# Patient Record
Sex: FEMALE | Race: NATIVE HAWAIIAN OR OTHER PACIFIC ISLANDER
[De-identification: names, ages, dates, MRNs, and addresses within clinical notes are randomized per-mention and may not be internally consistent; named-entity substitution may affect disease eponyms.]

---

## 2021-04-12 ENCOUNTER — HOSPITAL ENCOUNTER (EMERGENCY)
Dept: HOSPITAL 35 - ER | Age: 46
Discharge: HOME | End: 2021-04-12
Payer: COMMERCIAL

## 2021-04-12 VITALS — SYSTOLIC BLOOD PRESSURE: 124 MMHG | DIASTOLIC BLOOD PRESSURE: 64 MMHG

## 2021-04-12 DIAGNOSIS — Z91.041: ICD-10-CM

## 2021-04-12 DIAGNOSIS — Z79.899: ICD-10-CM

## 2021-04-12 DIAGNOSIS — Z88.8: ICD-10-CM

## 2021-04-12 DIAGNOSIS — Z88.0: ICD-10-CM

## 2021-04-12 DIAGNOSIS — M54.5: ICD-10-CM

## 2021-04-12 DIAGNOSIS — I10: ICD-10-CM

## 2021-04-12 DIAGNOSIS — Z90.49: ICD-10-CM

## 2021-04-12 DIAGNOSIS — M25.561: Primary | ICD-10-CM

## 2021-05-11 ENCOUNTER — HOSPITAL ENCOUNTER (EMERGENCY)
Dept: HOSPITAL 35 - ER | Age: 46
LOS: 1 days | Discharge: STILL ADMITTED OR ADMINISTRATIVE | End: 2021-05-12
Payer: COMMERCIAL

## 2021-05-11 VITALS — SYSTOLIC BLOOD PRESSURE: 146 MMHG | DIASTOLIC BLOOD PRESSURE: 95 MMHG

## 2021-05-11 VITALS — BODY MASS INDEX: 43.8 KG/M2 | WEIGHT: 232.01 LBS | HEIGHT: 61 IN

## 2021-05-11 DIAGNOSIS — Z91.041: ICD-10-CM

## 2021-05-11 DIAGNOSIS — Z88.5: ICD-10-CM

## 2021-05-11 DIAGNOSIS — E11.9: ICD-10-CM

## 2021-05-11 DIAGNOSIS — Z79.4: ICD-10-CM

## 2021-05-11 DIAGNOSIS — Z90.49: ICD-10-CM

## 2021-05-11 DIAGNOSIS — R10.13: ICD-10-CM

## 2021-05-11 DIAGNOSIS — Z88.8: ICD-10-CM

## 2021-05-11 DIAGNOSIS — Z79.899: ICD-10-CM

## 2021-05-11 DIAGNOSIS — F10.920: Primary | ICD-10-CM

## 2021-05-11 DIAGNOSIS — Z88.0: ICD-10-CM

## 2021-05-11 DIAGNOSIS — R11.2: ICD-10-CM

## 2021-05-11 DIAGNOSIS — F41.9: ICD-10-CM

## 2021-05-11 DIAGNOSIS — I10: ICD-10-CM

## 2021-05-11 DIAGNOSIS — N39.0: ICD-10-CM

## 2021-05-11 DIAGNOSIS — K59.00: ICD-10-CM
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
## 2021-05-11 LAB
ALBUMIN SERPL-MCNC: 3.6 G/DL (ref 3.4–5)
ALT SERPL-CCNC: 286 U/L (ref 30–65)
AMYLASE SERPL-CCNC: 107 U/L (ref 25–115)
ANION GAP SERPL CALC-SCNC: 17 MMOL/L (ref 7–16)
AST SERPL-CCNC: 187 U/L (ref 15–37)
BACTERIA-REFLEX: (no result) /HPF
BASOPHILS NFR BLD AUTO: 1.5 % (ref 0–2)
BILIRUB DIRECT SERPL-MCNC: < 0.1 MG/DL
BILIRUB SERPL-MCNC: 0.2 MG/DL (ref 0.2–1)
BILIRUB UR-MCNC: NEGATIVE MG/DL
BUN SERPL-MCNC: 10 MG/DL (ref 7–18)
CALCIUM SERPL-MCNC: 9 MG/DL (ref 8.5–10.1)
CHLORIDE SERPL-SCNC: 102 MMOL/L (ref 98–107)
CO2 SERPL-SCNC: 20 MMOL/L (ref 21–32)
COLOR UR: YELLOW
CREAT SERPL-MCNC: 0.8 MG/DL (ref 0.6–1)
EOSINOPHIL NFR BLD: 3.5 % (ref 0–3)
ERYTHROCYTE [DISTWIDTH] IN BLOOD BY AUTOMATED COUNT: 14.3 % (ref 10.5–14.5)
FINE GRAN CASTS #/AREA URNS LPF: (no result) /LPF
GLUCOSE SERPL-MCNC: 170 MG/DL (ref 74–106)
GRANULOCYTES NFR BLD MANUAL: 51.4 % (ref 36–66)
HCT VFR BLD CALC: 40.7 % (ref 37–47)
HGB BLD-MCNC: 13.7 GM/DL (ref 12–15)
KETONES UR STRIP-MCNC: NEGATIVE MG/DL
LIPASE: 168 U/L (ref 73–393)
LYMPHOCYTES NFR BLD AUTO: 33.5 % (ref 24–44)
MAGNESIUM SERPL-MCNC: 1.7 MG/DL (ref 1.8–2.4)
MCH RBC QN AUTO: 32.1 PG (ref 26–34)
MCHC RBC AUTO-ENTMCNC: 33.6 G/DL (ref 28–37)
MCV RBC: 95.6 FL (ref 80–100)
MONOCYTES NFR BLD: 10.1 % (ref 1–8)
NEUTROPHILS # BLD: 2.6 THOU/UL (ref 1.4–8.2)
PHOSPHATE SERPL-MCNC: 4 MG/DL (ref 2.5–4.9)
PLATELET # BLD: 217 THOU/UL (ref 150–400)
POTASSIUM SERPL-SCNC: 3.3 MMOL/L (ref 3.5–5.1)
PROT SERPL-MCNC: 8.2 G/DL (ref 6.4–8.2)
RBC # BLD AUTO: 4.25 MIL/UL (ref 4.2–5)
RBC # UR STRIP: (no result) /UL
RBC #/AREA URNS HPF: (no result) /HPF
SODIUM SERPL-SCNC: 139 MMOL/L (ref 136–145)
SP GR UR STRIP: <= 1.005 (ref 1–1.03)
SQUAMOUS: (no result) /LPF (ref 0–3)
TRANSITIONAL EPITHEL CELL: (no result) /LPF
TROPONIN I SERPL-MCNC: <0.06 NG/ML (ref ?–0.06)
URINE CLARITY: CLEAR
URINE GLUCOSE-RANDOM*: NEGATIVE
URINE LEUKOCYTES-REFLEX: NEGATIVE
URINE NITRITE-REFLEX: NEGATIVE
URINE PROTEIN (DIPSTICK): NEGATIVE
URINE WBC-REFLEX: (no result) /HPF (ref 0–5)
UROBILINOGEN UR STRIP-ACNC: 0.2 E.U./DL (ref 0.2–1)
WBC # BLD AUTO: 5.1 THOU/UL (ref 4–11)

## 2021-05-12 NOTE — EKG
[place] [address] [facility]
[place]  [address]
Phone:  [phone]                    ELECTROCARDIOGRAM REPORT      
_______________________________________________________________________________
 
Name:       DES LEE      Room #:                     DEP [facility]#:      [id number]     Account #:      [id number]  
Admission:  21    Attend Phys:                          
Discharge:  21    Date of Birth:  [birth year]  
                                                          Report #: [id number]
   [id number]
_______________________________________________________________________________
                         [facility] ED
                                       
Test Date:    2021               Test Time:    20:57:15
Pat Name:     DES LEE         Department:   
Patient ID:   [id number]            Room:          
Gender:       F                        Technician:   unknown
:          1975               Requested By: Marshal Cunningham
Order Number: [id number] MD:   Frankie Choudhury
                                 Measurements
Intervals                              Axis          
Rate:         124                      P:            17
FL:           155                      QRS:          -9
QRSD:         83                       T:            13
QT:           313                                    
QTc:          450                                    
                           Interpretive Statements
Sinus tachycardia
Inferior infarct, old
Poor R wave progression
No previous ECG available for comparison
Electronically Signed On 2021 13:47:56 CDT by Frankie Choudhuyr
[email or web address]
 
 
 
 
 
 
 
 
 
 
 
 
 
 
 
 
 
 
 
 
 
  <ELECTRONICALLY SIGNED>
   By: Frankie Choudhury MD, [facility]   
  21     1347
D: 21                           _____________________________________
T: 21                           Frankie Choudhury MD, FACC     /EPI

## 2021-06-24 ENCOUNTER — HOSPITAL ENCOUNTER (INPATIENT)
Dept: HOSPITAL 35 - ER | Age: 46
LOS: 5 days | Discharge: HOME | DRG: 392 | End: 2021-06-29
Attending: HOSPITALIST | Admitting: HOSPITALIST
Payer: COMMERCIAL

## 2021-06-24 VITALS — SYSTOLIC BLOOD PRESSURE: 170 MMHG | DIASTOLIC BLOOD PRESSURE: 89 MMHG

## 2021-06-24 VITALS — HEIGHT: 60.98 IN | BODY MASS INDEX: 44.75 KG/M2 | WEIGHT: 237 LBS

## 2021-06-24 DIAGNOSIS — Z90.49: ICD-10-CM

## 2021-06-24 DIAGNOSIS — K52.9: Primary | ICD-10-CM

## 2021-06-24 DIAGNOSIS — E11.9: ICD-10-CM

## 2021-06-24 DIAGNOSIS — G89.29: ICD-10-CM

## 2021-06-24 DIAGNOSIS — Z91.041: ICD-10-CM

## 2021-06-24 DIAGNOSIS — F41.9: ICD-10-CM

## 2021-06-24 DIAGNOSIS — C95.91: ICD-10-CM

## 2021-06-24 DIAGNOSIS — E78.5: ICD-10-CM

## 2021-06-24 DIAGNOSIS — N83.209: ICD-10-CM

## 2021-06-24 DIAGNOSIS — Z88.0: ICD-10-CM

## 2021-06-24 DIAGNOSIS — Z79.899: ICD-10-CM

## 2021-06-24 DIAGNOSIS — E66.9: ICD-10-CM

## 2021-06-24 DIAGNOSIS — K62.89: ICD-10-CM

## 2021-06-24 DIAGNOSIS — I10: ICD-10-CM

## 2021-06-24 DIAGNOSIS — Z88.5: ICD-10-CM

## 2021-06-24 DIAGNOSIS — Z88.8: ICD-10-CM

## 2021-06-24 DIAGNOSIS — K64.4: ICD-10-CM

## 2021-06-24 DIAGNOSIS — K92.1: ICD-10-CM

## 2021-06-24 DIAGNOSIS — J45.909: ICD-10-CM

## 2021-06-24 PROCEDURE — 62900: CPT

## 2021-06-24 PROCEDURE — 70005: CPT

## 2021-06-24 PROCEDURE — 10195: CPT

## 2021-06-24 PROCEDURE — 62110: CPT

## 2021-06-25 VITALS — DIASTOLIC BLOOD PRESSURE: 68 MMHG | SYSTOLIC BLOOD PRESSURE: 120 MMHG

## 2021-06-25 VITALS — DIASTOLIC BLOOD PRESSURE: 114 MMHG | SYSTOLIC BLOOD PRESSURE: 173 MMHG

## 2021-06-25 VITALS — SYSTOLIC BLOOD PRESSURE: 120 MMHG | DIASTOLIC BLOOD PRESSURE: 68 MMHG

## 2021-06-25 VITALS — SYSTOLIC BLOOD PRESSURE: 150 MMHG | DIASTOLIC BLOOD PRESSURE: 96 MMHG

## 2021-06-25 LAB
ALBUMIN SERPL-MCNC: 3.4 G/DL (ref 3.4–5)
ALT SERPL-CCNC: 167 U/L (ref 30–65)
ANION GAP SERPL CALC-SCNC: 15 MMOL/L (ref 7–16)
AST SERPL-CCNC: 101 U/L (ref 15–37)
BASOPHILS NFR BLD AUTO: 1.1 % (ref 0–2)
BILIRUB SERPL-MCNC: 0.3 MG/DL (ref 0.2–1)
BILIRUB UR-MCNC: NEGATIVE MG/DL
BUN SERPL-MCNC: 13 MG/DL (ref 7–18)
CALCIUM SERPL-MCNC: 8.5 MG/DL (ref 8.5–10.1)
CHLORIDE SERPL-SCNC: 99 MMOL/L (ref 98–107)
CO2 SERPL-SCNC: 23 MMOL/L (ref 21–32)
COLOR UR: YELLOW
CREAT SERPL-MCNC: 0.9 MG/DL (ref 0.6–1)
EOSINOPHIL NFR BLD: 2.9 % (ref 0–3)
ERYTHROCYTE [DISTWIDTH] IN BLOOD BY AUTOMATED COUNT: 13.7 % (ref 10.5–14.5)
EST. AVERAGE GLUCOSE BLD GHB EST-MCNC: 217 MG/DL
GLUCOSE SERPL-MCNC: 255 MG/DL (ref 74–106)
GLYCOHEMOGLOBIN (HGB A1C): 9.2 % (ref 4.8–5.6)
GRANULOCYTES NFR BLD MANUAL: 61.3 % (ref 36–66)
HCT VFR BLD CALC: 39.9 % (ref 37–47)
HGB BLD-MCNC: 13.1 GM/DL (ref 12–15)
KETONES UR STRIP-MCNC: (no result) MG/DL
LIPASE: 153 U/L (ref 73–393)
LYMPHOCYTES NFR BLD AUTO: 26.2 % (ref 24–44)
MCH RBC QN AUTO: 31.8 PG (ref 26–34)
MCHC RBC AUTO-ENTMCNC: 32.9 G/DL (ref 28–37)
MCV RBC: 96.9 FL (ref 80–100)
MONOCYTES NFR BLD: 8.5 % (ref 1–8)
NEUTROPHILS # BLD: 4.8 THOU/UL (ref 1.4–8.2)
PLATELET # BLD: 279 THOU/UL (ref 150–400)
POTASSIUM SERPL-SCNC: 4.7 MMOL/L (ref 3.5–5.1)
PROT SERPL-MCNC: 8.1 G/DL (ref 6.4–8.2)
RBC # BLD AUTO: 4.11 MIL/UL (ref 4.2–5)
RBC # UR STRIP: NEGATIVE /UL
SODIUM SERPL-SCNC: 137 MMOL/L (ref 136–145)
SP GR UR STRIP: 1.02 (ref 1–1.03)
URINE CLARITY: CLEAR
URINE GLUCOSE-RANDOM*: (no result)
URINE LEUKOCYTES-REFLEX: NEGATIVE
URINE NITRITE-REFLEX: NEGATIVE
URINE PROTEIN (DIPSTICK): NEGATIVE
UROBILINOGEN UR STRIP-ACNC: 0.2 E.U./DL (ref 0.2–1)
VIT B12 SERPL-MCNC: 568 PG/ML (ref 193–986)
WBC # BLD AUTO: 7.8 THOU/UL (ref 4–11)

## 2021-06-25 NOTE — EKG
[address]  [address]
Phone:  [phone]                    ELECTROCARDIOGRAM REPORT      
_______________________________________________________________________________
 
Name:       ROSADES KARLIE      Room #:         447-P       ADM IN  
M.R.#:      [id number]     Account #:      [id number]  
Admission:  21    Attend Phys:    Moreno Vuong MD 
Discharge:              Date of Birth:  [birth year]  
                                                          Report #: [id number]
   [id number]
_______________________________________________________________________________
                          [facility]
                                       
Test Date:    2021               Test Time:    09:22:30
Pat Name:     DES LEE         Department:   
Patient ID:   [id number]            Room:         [facility]
Gender:       F                        Technician:   FSCHWALANASTASIA
:          1975               Requested By: Fei Jiménez
Order Number: [id number] MD:   Domingo Julio
                                 Measurements
Intervals                              Axis          
Rate:         92                       P:            40
PA:           167                      QRS:          17
QRSD:         88                       T:            31
QT:           355                                    
QTc:          440                                    
                           Interpretive Statements
Sinus rhythm
Compared to ECG 2021 20:57:15
Sinus tachycardia no longer present
Myocardial infarct finding no longer present
Poor R-wave progression no longer present
Electronically Signed On 2021 11:36:07 CDT by Domingo Julio
[email or web address]
 
 
 
 
 
 
 
 
 
 
 
 
 
 
 
 
 
 
 
 
  <ELECTRONICALLY SIGNED>
   By: Domingo Julio MD, [facility]    
  21     1136
D: 21                           _____________________________________
T: 21                           Domingo Julio MD, [facility]      /EPI

## 2021-06-25 NOTE — NUR
ASSUMED PT CARE FROM ED AT 1030. PT IS ALERT & ORIENTED X4. PT HAS IV SITE ON
ROSEMARIE 22 GAUGE RUNNING NS @ 100ML/HR. PT C/O OF PAIN ON R ABDOMEN. NO TEETH
NOTED. INFORMED DR ABOUT PAIN, NAUSEA, FEVER AND DIET ORDERED. AWAITING FOR
ORDERS. FINISHED ADMISSION. PT IS UP AD RISHI. PT IS ON ROOM AIR. GIVEN CHICKEN
BROTH AS PER PT REQUEST THIS AM. PT ON THE BED WATCHING TV, BED ON THE LOWEST
POSITION, SIDE RAILS UP, CALL LIGHT WITHIN REACH. WILL CONTINUE TO MONITOR PT.
FOLLOW POC.

## 2021-06-26 VITALS — DIASTOLIC BLOOD PRESSURE: 100 MMHG | SYSTOLIC BLOOD PRESSURE: 144 MMHG

## 2021-06-26 VITALS — SYSTOLIC BLOOD PRESSURE: 124 MMHG | DIASTOLIC BLOOD PRESSURE: 71 MMHG

## 2021-06-26 VITALS — DIASTOLIC BLOOD PRESSURE: 80 MMHG | SYSTOLIC BLOOD PRESSURE: 124 MMHG

## 2021-06-26 VITALS — DIASTOLIC BLOOD PRESSURE: 82 MMHG | SYSTOLIC BLOOD PRESSURE: 135 MMHG

## 2021-06-26 VITALS — DIASTOLIC BLOOD PRESSURE: 110 MMHG | SYSTOLIC BLOOD PRESSURE: 165 MMHG

## 2021-06-26 VITALS — DIASTOLIC BLOOD PRESSURE: 95 MMHG | SYSTOLIC BLOOD PRESSURE: 152 MMHG

## 2021-06-26 LAB
ANION GAP SERPL CALC-SCNC: 12 MMOL/L (ref 7–16)
BASOPHILS NFR BLD AUTO: 0.9 % (ref 0–2)
BUN SERPL-MCNC: 11 MG/DL (ref 7–18)
CALCIUM SERPL-MCNC: 8.6 MG/DL (ref 8.5–10.1)
CHLORIDE SERPL-SCNC: 103 MMOL/L (ref 98–107)
CO2 SERPL-SCNC: 25 MMOL/L (ref 21–32)
CREAT SERPL-MCNC: 0.7 MG/DL (ref 0.6–1)
EOSINOPHIL NFR BLD: 4.6 % (ref 0–3)
ERYTHROCYTE [DISTWIDTH] IN BLOOD BY AUTOMATED COUNT: 13.5 % (ref 10.5–14.5)
GLUCOSE SERPL-MCNC: 198 MG/DL (ref 74–106)
GRANULOCYTES NFR BLD MANUAL: 66.6 % (ref 36–66)
HCT VFR BLD CALC: 38.5 % (ref 37–47)
HGB BLD-MCNC: 12.8 GM/DL (ref 12–15)
LYMPHOCYTES NFR BLD AUTO: 19.3 % (ref 24–44)
MAGNESIUM SERPL-MCNC: 1.7 MG/DL (ref 1.8–2.4)
MCH RBC QN AUTO: 32.4 PG (ref 26–34)
MCHC RBC AUTO-ENTMCNC: 33.3 G/DL (ref 28–37)
MCV RBC: 97.2 FL (ref 80–100)
MONOCYTES NFR BLD: 8.6 % (ref 1–8)
NEUTROPHILS # BLD: 3.8 THOU/UL (ref 1.4–8.2)
PLATELET # BLD: 214 THOU/UL (ref 150–400)
POTASSIUM SERPL-SCNC: 4 MMOL/L (ref 3.5–5.1)
RBC # BLD AUTO: 3.96 MIL/UL (ref 4.2–5)
SODIUM SERPL-SCNC: 140 MMOL/L (ref 136–145)
WBC # BLD AUTO: 5.7 THOU/UL (ref 4–11)

## 2021-06-26 NOTE — NUR
Patient alert and orinted x4, on room air, IV replaced this AM, up with SBA,
pain medications given per MAR, tolerating diet well, vital signs stable,
and afbreile. Call light with in reach, will continue to monitor.

## 2021-06-26 NOTE — NUR
PT IS A/O X4 AND IS UP AD RISHI IN THE ROOM. PT IS PLEASANT AND COOPERATIVE BUT
APPEARS RESTLESS AND ANXIOUS. SCRATCHING HER ALL OVER HER BODY. C/O OF ITCHING
FREQUENTLY AND OF ABDOMINAL PAIN. IS CONSISTENT IN ASKING FOR FOOD
APPROXIMATELY ONCE AN HOUR THIS NOC UNTIL 2AM. BP ELEVATED. NOTIFIED NP AND
WAS GIVEN AN ORDER FOR PRN BP LOWERING MEDICATION PRN Q6. AT THIS TIME PT IS
LYING IN HER BED AND APPEARS TO BE SLEEPING WITH EYES CLOSED. WILL CONTINUE TO
MONITOR.

## 2021-06-27 VITALS — SYSTOLIC BLOOD PRESSURE: 132 MMHG | DIASTOLIC BLOOD PRESSURE: 82 MMHG

## 2021-06-27 VITALS — SYSTOLIC BLOOD PRESSURE: 151 MMHG | DIASTOLIC BLOOD PRESSURE: 98 MMHG

## 2021-06-27 VITALS — DIASTOLIC BLOOD PRESSURE: 68 MMHG | SYSTOLIC BLOOD PRESSURE: 129 MMHG

## 2021-06-27 LAB
ALBUMIN SERPL-MCNC: 3 G/DL (ref 3.4–5)
ALT SERPL-CCNC: 230 U/L (ref 14–59)
ANION GAP SERPL CALC-SCNC: 9 MMOL/L (ref 7–16)
AST SERPL-CCNC: 210 U/L (ref 15–37)
BILIRUB SERPL-MCNC: 0.3 MG/DL (ref 0.2–1)
BUN SERPL-MCNC: 10 MG/DL (ref 7–18)
CALCIUM SERPL-MCNC: 8.7 MG/DL (ref 8.5–10.1)
CHLORIDE SERPL-SCNC: 103 MMOL/L (ref 98–107)
CO2 SERPL-SCNC: 25 MMOL/L (ref 21–32)
CREAT SERPL-MCNC: 0.7 MG/DL (ref 0.6–1)
GLUCOSE SERPL-MCNC: 294 MG/DL (ref 74–106)
POTASSIUM SERPL-SCNC: 4.1 MMOL/L (ref 3.5–5.1)
PROT SERPL-MCNC: 7.1 G/DL (ref 6.4–8.2)
SODIUM SERPL-SCNC: 137 MMOL/L (ref 136–145)

## 2021-06-27 NOTE — NUR
RECEIVED CARE OF THIS PATIENT AT 1900.  PATIENT ALERT AND ORIENTED X4.  UP TO
BATHROOM WITH SBA.  ACCUCHECK , NO COVERAGE ORDERED.  WANTED TO EAT
SEVERAL TIMES BUT EXPLANED TO PATIENT THAT SHE SHOULD NOT EAT ANY MORE BECAUSE
OF HER BLOOD SUGAR.  IV PATENT IN L CHEST WITH FLUIDS INFUSING.  C/O PAIN.
MED GIVEN.  SLEPT VERY LITTLE  THIS SHIFT.

## 2021-06-28 VITALS — SYSTOLIC BLOOD PRESSURE: 154 MMHG | DIASTOLIC BLOOD PRESSURE: 98 MMHG

## 2021-06-28 VITALS — DIASTOLIC BLOOD PRESSURE: 95 MMHG | SYSTOLIC BLOOD PRESSURE: 152 MMHG

## 2021-06-28 VITALS — DIASTOLIC BLOOD PRESSURE: 71 MMHG | SYSTOLIC BLOOD PRESSURE: 144 MMHG

## 2021-06-28 VITALS — SYSTOLIC BLOOD PRESSURE: 149 MMHG | DIASTOLIC BLOOD PRESSURE: 95 MMHG

## 2021-06-28 LAB
HCV AB SER IA-ACNC: <0.1 (ref 0–0.9)
IRON SERPL-MCNC: 106 UG/DL (ref 50–170)
SAO2 % BLD FROM PO2: 29 % (ref 20–39)
TIBC SERPL-MCNC: 361 UG/DL (ref 250–450)

## 2021-06-28 PROCEDURE — 0DBN8ZX EXCISION OF SIGMOID COLON, VIA NATURAL OR ARTIFICIAL OPENING ENDOSCOPIC, DIAGNOSTIC: ICD-10-PCS | Performed by: SPECIALIST

## 2021-06-28 PROCEDURE — 0DBP8ZX EXCISION OF RECTUM, VIA NATURAL OR ARTIFICIAL OPENING ENDOSCOPIC, DIAGNOSTIC: ICD-10-PCS | Performed by: SPECIALIST

## 2021-06-28 PROCEDURE — 0DB68ZX EXCISION OF STOMACH, VIA NATURAL OR ARTIFICIAL OPENING ENDOSCOPIC, DIAGNOSTIC: ICD-10-PCS | Performed by: SPECIALIST

## 2021-06-28 NOTE — NUR
RECEIVED CARE OF THIS PATIENT AT 1900.  PATIENT ALERT AND ORIENTED X4.  UP TO
BATHROOM EVERY FEW MINUTES.  HAS HAD SEVERAL SMALL STOOLS PER PATIENT.  NPO
SINCE MN FOR A COLONSCOPY THIS AM.  ACCUCHECK , NO COVERAGE ORDERED.
IV IN L CHEST PATENT WITH FLUIDS INFUSING.  C/O PAIN, MED GIVEN.  SLEPT OFF
AND ON DURING NIGHT.

## 2021-06-28 NOTE — NUR
ASSESSMENT: CM REVIEWED CHART AND MET WITH PATIENT AT THE BEDSIDE. PT WAS
ASMITTED DUE TO INTRACTABLE ABDOMINAL PAIN. PT HAD EGD COLONSCOPY TODAY,
BIPOSIES OBTAINED. PTS DIET IS ADVANCING AND POSSIBLE DISCHARGE HOME SOON. PT
REPORTS THAT SHE LIVES IN AN APT ALONE WITH HER DOG. PT REPORTS ABOUT 7 STEPS
WITH HANDRAILS TO ENTER THE APT AND NO STEPS ONCE INSIDE. PT REPORTS SHE IS
INDEPENDENT WITH ADLS AND AMBULATION. PT HAS A  THROUGH [facility] NAMED [facility] ([phone]). PT PROVIDED HER INFORMATION
AND WAS OK WITH CM UPDATING HER ON INFORMATION. CM NOTIFIED  AND
SHE REPORTS SHE ASSIST PATIENT AND PT IS CURRENTLY ALSO ON A HOUSING VOUCHER
WHERE THEY ASSIST WITH HER RENT. PT WILL LIKELY DISCHARGE HOME SOON. CM WILL
CONTINUE TO FOLLOW TO ASSIST  AS NEEDED.

## 2021-06-28 NOTE — NUR
Received awake on bed. On nothing per orem- pt informed and aware. On MS, not
on telemetry; no complains and signs of chest pain, crushing sensation and
heaviness. Assisted in ADLs. Vital signs stable.
On room air. On blood sugar monitoring, taken and recorded accordingly.
Continent of bowel and bladder, able to go to the toilet independently; calls
appropriately for assistance. With NS at 100cc/hr, infusing well at L chest.
No complains of pain made during assessment. No nausea, no vomiting and no
abdominal pain noted.
For EGD/Colonoscopy today, consent to be signed; report given to GI nurse; pt
to be fetched arround 11:30-1200pm- pt updated.
To continue monitoring patient.

## 2021-06-29 VITALS — DIASTOLIC BLOOD PRESSURE: 61 MMHG | SYSTOLIC BLOOD PRESSURE: 132 MMHG

## 2021-06-29 VITALS — SYSTOLIC BLOOD PRESSURE: 120 MMHG | DIASTOLIC BLOOD PRESSURE: 70 MMHG

## 2021-06-29 LAB
ALBUMIN SERPL-MCNC: 2.9 G/DL (ref 3.4–5)
ALT SERPL-CCNC: 200 U/L (ref 14–59)
AST SERPL-CCNC: 115 U/L (ref 15–37)
BILIRUB DIRECT SERPL-MCNC: 0.1 MG/DL
BILIRUB SERPL-MCNC: 0.3 MG/DL (ref 0.2–1)
CERULOPLASMIN SERPL-MCNC: 32.7 MG/DL (ref 19–39)
PROT SERPL-MCNC: 6.9 G/DL (ref 6.4–8.2)

## 2021-06-29 NOTE — NUR
ON-GOING ASSESSMENT: CM REVIEWED CHART AND SPOKE WITH PT AT THE BEDSIDE. PT
HAS ORDERS TO DISCHARGE HOME TODAY. PTS  MP [facility] IS AWARE OF
DISCHARGE AS CM NOTIFIED VIA  AND ALSO SPOKE WITH HER YESTERDAY. PT REPORTS
HER BOYFRIEND IS AT HER APT AND WILL BE THERE WHEN SHE GETS HOME BUT IS
NEEDING TRANSPORTATION. PT REPORTS SHE IS IN A HURRY TO GET HOME AND
REQUESTING A CAB VOUCHER. CM COMPLETED CAB VOUCHER AND PROVIDED TO BEDSIDE RN
VOUCHER#[id number]. CASE CLOSED.

## 2021-06-29 NOTE — P
[facility]
[address]
[place]   [address]                     PROCEDURE REPORT              
_______________________________________________________________________________
 
Name:       DES LEE      Room #:         447-P       ADM IN  
M.R.#:      [id number]                       Account #:      [id number]  
Admission:  06/25/21    Attend Phys:    Moreno Vuong MD 
Discharge:              Date of Birth:  [birth year]  
                                                          Report #: [id number]
                                                                    [id number] 
_______________________________________________________________________________
THIS REPORT FOR:  
 
cc:  [facility] physician unknown
     [facility] physician unknown                                       
     Carlos Omer MD                                   ~
 
DOC #: [id number]
 
cc:     MD Carlos Dowling MD
DATE OF SERVICE: 06/28/2021
 
PROCEDURE PERFORMED:  Colonoscopy with biopsies.
 
HISTORY OF PRESENT ILLNESS:  The patient is a 46-year-old female with a history 
of constipation, bright red blood per rectum.  No previous history of 
colonoscopy.  No family history of colon cancer or inflammatory bowel disease.  
Also, complains of abdominal pain.  This is right upper and right lower 
quadrant.  She has had a previous cholecystectomy.  She underwent a CT scan of 
the abdomen and pelvis on 06/25/2021 showing mild hepatosplenomegaly.  No 
evidence of obstruction.  A cyst was noted in the right ovary.  Pelvic 
ultrasound performed.  Very limited exam.  Normal uterus and endometrium.  
Ovaries were not visualized.  Abdominal ultrasound, mild hepatic steatosis.  
Previous cholecystectomy changes noted.  No evidence of bile duct dilation.  KUB
yesterday shows significant amount of stool in the colon without evidence of 
bowel obstruction.
 
DESCRIPTION OF PROCEDURE:  The risks and benefits of the procedure were 
explained to the patient, those risks including but not limited to bleeding, 
perforation and the risk of sedation.  She understood these risks and gave 
informed consent.  Sedation was given using propofol per anesthesia.  Next, a 
digital rectal exam was initially performed, which was normal.  Next, using a 
standard Olympus colonoscope, the scope was placed in the patient's anus and 
advanced under direct vision to the cecum.  The overall prep was good in most 
areas.  It was somewhat poor and very limited areas, which did limit 
visualization, but most areas were well visualized today.  The cecum and 
ileocecal valve were normal in appearance.  Ascending, transverse and descending
colon were normal.  In the distal sigmoid colon and the rectum, mild patchy 
erythema was noted.  Biopsies were obtained to rule out the possibility of 
colitis.  This may be just secondary to prep effect; however, close examination 
of the anal canal showed external hemorrhoids, nonbleeding.  There was no 
evidence of blood throughout the exam today.  The scope was then withdrawn and 
the procedure terminated.  The patient tolerated the procedure well.
 
IMPRESSION:
1.  Possible mild colitis involving the distal sigmoid colon and rectum.  
 
 
 
[address]   [address]                     PROCEDURE REPORT              
_______________________________________________________________________________
 
Name:       DES LEE      Room #:         447-P       [facility] [place]  
.R.#:      [id number]                       Account #:      [id number]  
Admission:  06/25/21    Attend Phys:    Moreno Vuong MD 
Discharge:              Date of Birth:  [birth year]  
                                                          Report #: [id number]
                                                                    [id number] 
_______________________________________________________________________________
 
Biopsies were obtained.  This could be, however, just prep effect.
2.  External hemorrhoids, nonbleeding, likely source of recent bright red blood 
per rectum.
3.  Otherwise, normal colonoscopy.
 
RECOMMENDATIONS:
1.  Await biopsy results.
2.  We will advance diet.  Would continue MiraLax on a daily basis as the 
patient has a history of chronic constipation.  Etiology of her abdominal pain 
is unclear.  CT was essentially negative other than right ovarian cyst was seen.
 She has had a previous cholecystectomy.  She does have elevated liver function 
test, may need to proceed with further liver lab workup.  Agree with 
antispasmodic such as Levsin or Bentyl to see if this is helpful for possible 
irritable bowel syndrome.
 
Thank you for allowing me to participate in her care.
 
Carlos Omer MD
[facility]
 
D: 06/28/2021 01:49 PM
T: 06/28/2021 11:16 PM
 
 
 
 
 
 
 
 
 
 
 
 
 
 
 
 
 
 
 
 
 
  <ELECTRONICALLY SIGNED>
   By: Carlos Omer MD   
  06/29/21     1242
D: 06/28/21 1249                           _____________________________________
T: 06/28/21 2216                           Carlos Omer MD     /nt

## 2021-06-29 NOTE — NUR
ASSUMED PT CARE AT 1930. PT WAS IN BED TRYING TO SLEEP. PT IS ALERT AND
ORIENTED. INTRODUCED SELF TO PT. WHITE BOARD WAS UPDATED. PT DID NOT EXPRESS
ANY CONCERNS AND NO VISIBLE SIGN OF DISTRESS WAS NOTED. BED ON LOWEST LOCKED
POSITION WITH BED ALRAM ON AND CALL LIGHT WITHIN REACH.

## 2021-06-29 NOTE — P
[facility]
[address]
[place]   [address]                     PROCEDURE REPORT              
_______________________________________________________________________________
 
Name:       DES LEE      Room #:         447-P       ADM IN  
M.R.#:      [id number]                       Account #:      [id number]  
Admission:  06/25/21    Attend Phys:    Moreno Vuong MD 
Discharge:              Date of Birth:  [birth year]  
                                                          Report #: [id number]
                                                                    [id number] 
_______________________________________________________________________________
THIS REPORT FOR:  
 
cc:  [facility] physician unknown
     [facility] physician unknown                                       
     Carlos Omer MD                                   ~
 
DOC #: [id number]
 
cc:     MD Carlos Dowling MD
DATE OF SERVICE: 06/28/2021
 
PROCEDURE PERFORMED:  Upper endoscopy with biopsies.
 
HISTORY OF PRESENT ILLNESS:  The patient is a 46-year-old female who was 
admitted on 06/25/2021 for nausea, vomiting, abdominal pain and constipation.  
She reports right upper and right lower quadrant abdominal pain, rectal burning 
and itching, constipation for the last several weeks.  CT scan of the abdomen 
and pelvis showed mild hepatosplenomegaly.  No evidence of bowel obstruction.  
She has had a previous cholecystectomy.  She does report intermittent heartburn 
symptoms.  She denies any dysphagia.  She also has elevated liver function 
tests.  Plan is for EGD and colonoscopy today.
 
DESCRIPTION OF PROCEDURE:  The risks and benefits of the procedure were 
explained to the patient, those risks including but not limited to bleeding, 
perforation and the risk of sedation.  She understood these risks and gave 
informed consent.  Sedation was given using propofol per anesthesia.  Next, 
using a standard Olympus upper endoscope, the scope was placed in the patient's 
mouth and advanced under direct vision through the esophagus, stomach and into 
the second portion of the duodenum.  The esophagus was normal throughout.  The 
GE junction was normal.  Overall, the gastric mucosa was normal.  Biopsies were 
obtained to rule out H. pylori.  The pylorus was normal and patent.  The 
duodenal bulb, first and second portion were all normal.  The scope was then 
withdrawn and the procedure terminated.  The patient tolerated the procedure 
well.
 
IMPRESSION:
1.  Normal upper endoscopy.
 
RECOMMENDATIONS:
1.  Await biopsy results.
2.  Continue PPI therapy.
3.  We will proceed with colonoscopy next today.
 
Thank you for allowing me to participate in her care.
 
 
 
 
[address]   [address]                     PROCEDURE REPORT              
_______________________________________________________________________________
 
Name:       ROSADES      Room #:         447-P       [facility] [place]  
.R.#:      [id number]                       Account #:      [id number]  
Admission:  06/25/21    Attend Phys:    Moreno Vuong MD 
Discharge:              Date of Birth:  [birth year]  
                                                          Report #: [id number]
                                                                    [id number] 
_______________________________________________________________________________
 
Carlos Omer MD
[facility]
 
D: 06/28/2021 01:46 PM
T: 06/28/2021 11:09 PM
 
 
 
 
 
 
 
 
 
 
 
 
 
 
 
 
 
 
 
 
 
 
 
 
 
 
 
 
 
 
 
 
 
 
 
 
 
 
  <ELECTRONICALLY SIGNED>
   By: Carlos Omer MD   
  06/29/21     1242
D: 06/28/21 1246                           _____________________________________
T: 06/28/21 2209                           Carlos Omer MD     /nt

## 2021-06-29 NOTE — NUR
ASSESSED CARE OF PT AT 7:00 AM. PT IS ALERT AND UP AT RISHI. CONTINENT OF BOWEL
AND BLADDER. PAIN NOTED IN LEFT LOWER ABDOMEN NOTED RELEIVED WITH MEDICATION.
IV ANTIBIOTICS INFUSED IN RIGHT FA.  PT. IS ON RA LUNGS ARE CLEAR AND SKIN IS
INTACT.  FALL PRECATIONS IN PLACE.  CALL LIGHT IS WITHIN REACH.  WILL CONTINUE
TO MONITOR UNTIL END OF SHIFT OR DISCHARGE.

## 2021-06-30 LAB — SMOOTH MUSCLE ANTIBODY: 6 UNITS (ref 0–19)

## 2021-07-01 NOTE — PATH
[facility]
[address]
[place]   [address]                     PATHOLOGY RPT PROCEDURE       
_______________________________________________________________________________
 
Name:       LEE,DES KARLIE      Room #:         447-P       [facility] IN  
M.R.#:      [id number]     Account #:      [id number]  
Admission:  06/25/21    Date of Birth:  [birth year]  
Discharge:  06/29/21                                    Report #:    [id number]
                                                        Path Case #: [id number]
_______________________________________________________________________________
 
[facility] Accession Number: [id number]
.                                                                01
Material submitted:                                        .
PART A: gastrointestinal site - GASTRIC BIOPSY
PART B: colon - COLITIS
.                                                                01
Clinical history:                                          .
ABDOMINAL PAIN, NAUSEA, VOMITING
.                                                                01
Frozen section diagnosis:                                       .
.
/QMS
.                                                                02
**********************************************************************
Diagnosis:
A.  Gastric mucosa, gastric R/O H. pylori, endoscopic biopsy:
- Mild reactive gastropathy.
- Negative for intestinal metaplasia or atrophy.
- Negative for Helicobacter pylori (properly controlled
immunohistochemical stain performed).
.
B.  Large intestine mucosa, colitis, endoscopic biopsy:
- Moderate active colitis involving all fragments sampled.
- Negative for dysplasia or malignancy.
(IUV:estela; 07/01/2021)
QMS  07/01/2021  1511 Local
**********************************************************************
.                                                                02
Comment:
B.  Sections of the colonic mucosa designated "colitis" show focal
cryptitis, and a moderately cellular lamina propria composed predominantly
of lymphocytes and plasma cells and occasional eosinophils.  Surface
ulceration is not identified.  There are no granulomas or viral
inclusions.  The process affects all the fragments with a similar
intensity.  Scattered rare architectural abnormalities and focal crypt
abscess formation are identified.  Given the description, the
differential diagnosis includes acute colitis of self-limited etiology,
inflammatory bowel disease (Crohn's disease as well as ulcerative
colitis), acute and chronic diverticulitis as well as medication/drug
induced colitis.  Please correlate with clinical as well as endoscopic
findings.
(IUV:estela; 07/01/2021)
.                                                                02
Electronically signed:                                     .
Jody Lopez MD, Pathologist
NPI- [id number]
.                                                                01
 
 
[place] [address]                     PATHOLOGY RPT PROCEDURE       
_______________________________________________________________________________
 
Name:       DES LEE      Room #:         447-P       [facility] [place]  
[facility].#:      [id number]     Account #:      [id number]  
Admission:  06/25/21    Date of Birth:  [birth year]  
Discharge:  06/29/21                                    Report #:    [id number]
                                                        Path Case #: [id number]
_______________________________________________________________________________
Gross description:                                         .
A.  The specimen is received in formalin, labeled "Des Lee
gastric biopsy".  Received are four segments of pale tan tissue ranging in
size from 0.2-0.6 cm in maximum dimensions.  The specimen is submitted
entirely in cassette A1.
.
B.  The specimen is received in formalin, labeled "Des Lee,
colitis".  Received are multiple segments of pale tan tissue ranging in
size from 0.2-0.5 cm in maximum dimensions.  The specimen is submitted
entirely in cassette B1.
(CAA; 6/30/2021)
QAC/QAC  06/30/2021  1335 Local
.                                                                02
Pathologist provided ICD-10:
K31.9, K52.9
.                                                                02
CPT                                                        .
122837, 737654, U43652
***Performed at:  01
   [address], [place], [place]  [id number]
   MD Todd Ortiz MD Phone:  [phone]
***Performed at:  02
   [address]  [id number]
   MD Jody Lopez MD Phone:  [phone]

## 2022-01-29 ENCOUNTER — HOSPITAL ENCOUNTER (EMERGENCY)
Dept: HOSPITAL 35 - ER | Age: 47
Discharge: HOME | End: 2022-01-29
Payer: COMMERCIAL

## 2022-01-29 VITALS — BODY MASS INDEX: 44.75 KG/M2 | WEIGHT: 237 LBS | HEIGHT: 61 IN

## 2022-01-29 VITALS — SYSTOLIC BLOOD PRESSURE: 122 MMHG | DIASTOLIC BLOOD PRESSURE: 50 MMHG

## 2022-01-29 DIAGNOSIS — Z91.041: ICD-10-CM

## 2022-01-29 DIAGNOSIS — E11.9: ICD-10-CM

## 2022-01-29 DIAGNOSIS — R11.2: Primary | ICD-10-CM

## 2022-01-29 DIAGNOSIS — Z88.5: ICD-10-CM

## 2022-01-29 DIAGNOSIS — F41.9: ICD-10-CM

## 2022-01-29 DIAGNOSIS — Z88.0: ICD-10-CM

## 2022-01-29 DIAGNOSIS — Z90.49: ICD-10-CM

## 2022-01-29 DIAGNOSIS — I10: ICD-10-CM

## 2022-01-29 DIAGNOSIS — Z79.899: ICD-10-CM

## 2022-01-29 LAB
ALBUMIN SERPL-MCNC: 3.6 G/DL (ref 3.4–5)
ALT SERPL-CCNC: 279 U/L (ref 30–65)
ANION GAP SERPL CALC-SCNC: 19 MMOL/L (ref 7–16)
AST SERPL-CCNC: 194 U/L (ref 15–37)
BASOPHILS NFR BLD AUTO: 1.4 % (ref 0–2)
BILIRUB DIRECT SERPL-MCNC: < 0.1 MG/DL
BILIRUB SERPL-MCNC: 0.3 MG/DL (ref 0.2–1)
BILIRUB UR-MCNC: NEGATIVE MG/DL
BUN SERPL-MCNC: 14 MG/DL (ref 7–18)
CALCIUM SERPL-MCNC: 8.4 MG/DL (ref 8.5–10.1)
CHLORIDE SERPL-SCNC: 96 MMOL/L (ref 98–107)
CO2 SERPL-SCNC: 20 MMOL/L (ref 21–32)
COLOR UR: YELLOW
CREAT SERPL-MCNC: 0.8 MG/DL (ref 0.6–1)
EOSINOPHIL NFR BLD: 2.4 % (ref 0–3)
ERYTHROCYTE [DISTWIDTH] IN BLOOD BY AUTOMATED COUNT: 13.5 % (ref 10.5–14.5)
GLUCOSE SERPL-MCNC: 443 MG/DL (ref 74–106)
GRANULOCYTES NFR BLD MANUAL: 50.1 % (ref 36–66)
HCT VFR BLD CALC: 35.8 % (ref 37–47)
HGB BLD-MCNC: 12.4 GM/DL (ref 12–15)
KETONES UR STRIP-MCNC: NEGATIVE MG/DL
LIPASE: 197 U/L (ref 73–393)
LYMPHOCYTES NFR BLD AUTO: 35.8 % (ref 24–44)
MCH RBC QN AUTO: 31.6 PG (ref 26–34)
MCHC RBC AUTO-ENTMCNC: 34.5 G/DL (ref 28–37)
MCV RBC: 91.7 FL (ref 80–100)
MONOCYTES NFR BLD: 10.3 % (ref 1–8)
NEUTROPHILS # BLD: 2.6 THOU/UL (ref 1.4–8.2)
PLATELET # BLD: 209 THOU/UL (ref 150–400)
POTASSIUM SERPL-SCNC: 4 MMOL/L (ref 3.5–5.1)
PROT SERPL-MCNC: 7.2 G/DL (ref 6.4–8.2)
RBC # BLD AUTO: 3.91 MIL/UL (ref 4.2–5)
RBC # UR STRIP: NEGATIVE /UL
SODIUM SERPL-SCNC: 135 MMOL/L (ref 136–145)
SP GR UR STRIP: 1.01 (ref 1–1.03)
URINE CLARITY: CLEAR
URINE GLUCOSE-RANDOM*: (no result)
URINE LEUKOCYTES-REFLEX: NEGATIVE
URINE NITRITE-REFLEX: NEGATIVE
URINE PROTEIN (DIPSTICK): NEGATIVE
UROBILINOGEN UR STRIP-ACNC: 0.2 E.U./DL (ref 0.2–1)
WBC # BLD AUTO: 5.2 THOU/UL (ref 4–11)